# Patient Record
Sex: FEMALE | Race: BLACK OR AFRICAN AMERICAN | Employment: UNEMPLOYED | URBAN - METROPOLITAN AREA
[De-identification: names, ages, dates, MRNs, and addresses within clinical notes are randomized per-mention and may not be internally consistent; named-entity substitution may affect disease eponyms.]

---

## 2024-09-03 ENCOUNTER — HOSPITAL ENCOUNTER (EMERGENCY)
Facility: HOSPITAL | Age: 59
Discharge: HOME | End: 2024-09-03
Attending: EMERGENCY MEDICINE
Payer: COMMERCIAL

## 2024-09-03 VITALS
TEMPERATURE: 97.7 F | OXYGEN SATURATION: 98 % | RESPIRATION RATE: 18 BRPM | HEART RATE: 79 BPM | DIASTOLIC BLOOD PRESSURE: 87 MMHG | SYSTOLIC BLOOD PRESSURE: 157 MMHG

## 2024-09-03 DIAGNOSIS — G89.29 CHRONIC BILATERAL LOW BACK PAIN WITH LEFT-SIDED SCIATICA: Primary | ICD-10-CM

## 2024-09-03 DIAGNOSIS — M54.42 CHRONIC BILATERAL LOW BACK PAIN WITH LEFT-SIDED SCIATICA: Primary | ICD-10-CM

## 2024-09-03 LAB
ALBUMIN SERPL BCG-MCNC: 4.1 G/DL (ref 3.5–5)
ALP SERPL-CCNC: 68 U/L (ref 34–104)
ALT SERPL W P-5'-P-CCNC: 9 U/L (ref 7–52)
ANION GAP SERPL CALCULATED.3IONS-SCNC: 4 MMOL/L (ref 4–13)
AST SERPL W P-5'-P-CCNC: 14 U/L (ref 13–39)
BACTERIA UR QL AUTO: ABNORMAL /HPF
BASOPHILS # BLD AUTO: 0.04 THOUSANDS/ÂΜL (ref 0–0.1)
BASOPHILS NFR BLD AUTO: 1 % (ref 0–1)
BILIRUB SERPL-MCNC: 0.32 MG/DL (ref 0.2–1)
BILIRUB UR QL STRIP: NEGATIVE
BUN SERPL-MCNC: 13 MG/DL (ref 5–25)
CALCIUM SERPL-MCNC: 9.1 MG/DL (ref 8.4–10.2)
CHLORIDE SERPL-SCNC: 105 MMOL/L (ref 96–108)
CLARITY UR: CLEAR
CO2 SERPL-SCNC: 30 MMOL/L (ref 21–32)
COLOR UR: ABNORMAL
CREAT SERPL-MCNC: 0.87 MG/DL (ref 0.6–1.3)
EOSINOPHIL # BLD AUTO: 0.23 THOUSAND/ÂΜL (ref 0–0.61)
EOSINOPHIL NFR BLD AUTO: 5 % (ref 0–6)
ERYTHROCYTE [DISTWIDTH] IN BLOOD BY AUTOMATED COUNT: 13 % (ref 11.6–15.1)
GFR SERPL CREATININE-BSD FRML MDRD: 73 ML/MIN/1.73SQ M
GLUCOSE SERPL-MCNC: 139 MG/DL (ref 65–140)
GLUCOSE UR STRIP-MCNC: NEGATIVE MG/DL
HCT VFR BLD AUTO: 36.4 % (ref 34.8–46.1)
HGB BLD-MCNC: 12.1 G/DL (ref 11.5–15.4)
HGB UR QL STRIP.AUTO: ABNORMAL
IMM GRANULOCYTES # BLD AUTO: 0.03 THOUSAND/UL (ref 0–0.2)
IMM GRANULOCYTES NFR BLD AUTO: 1 % (ref 0–2)
KETONES UR STRIP-MCNC: NEGATIVE MG/DL
LEUKOCYTE ESTERASE UR QL STRIP: NEGATIVE
LYMPHOCYTES # BLD AUTO: 2.25 THOUSANDS/ÂΜL (ref 0.6–4.47)
LYMPHOCYTES NFR BLD AUTO: 43 % (ref 14–44)
MCH RBC QN AUTO: 30.3 PG (ref 26.8–34.3)
MCHC RBC AUTO-ENTMCNC: 33.2 G/DL (ref 31.4–37.4)
MCV RBC AUTO: 91 FL (ref 82–98)
MONOCYTES # BLD AUTO: 0.43 THOUSAND/ÂΜL (ref 0.17–1.22)
MONOCYTES NFR BLD AUTO: 9 % (ref 4–12)
NEUTROPHILS # BLD AUTO: 2.07 THOUSANDS/ÂΜL (ref 1.85–7.62)
NEUTS SEG NFR BLD AUTO: 41 % (ref 43–75)
NITRITE UR QL STRIP: NEGATIVE
NON-SQ EPI CELLS URNS QL MICRO: ABNORMAL /HPF
NRBC BLD AUTO-RTO: 0 /100 WBCS
PH UR STRIP.AUTO: 6.5 [PH]
PLATELET # BLD AUTO: 323 THOUSANDS/UL (ref 149–390)
PMV BLD AUTO: 8.9 FL (ref 8.9–12.7)
POTASSIUM SERPL-SCNC: 3.4 MMOL/L (ref 3.5–5.3)
PROT SERPL-MCNC: 7 G/DL (ref 6.4–8.4)
PROT UR STRIP-MCNC: NEGATIVE MG/DL
RBC # BLD AUTO: 4 MILLION/UL (ref 3.81–5.12)
RBC #/AREA URNS AUTO: ABNORMAL /HPF
SODIUM SERPL-SCNC: 139 MMOL/L (ref 135–147)
SP GR UR STRIP.AUTO: 1.02 (ref 1–1.03)
UROBILINOGEN UR STRIP-ACNC: <2 MG/DL
WBC # BLD AUTO: 5.05 THOUSAND/UL (ref 4.31–10.16)
WBC #/AREA URNS AUTO: ABNORMAL /HPF

## 2024-09-03 PROCEDURE — 96360 HYDRATION IV INFUSION INIT: CPT

## 2024-09-03 PROCEDURE — 99284 EMERGENCY DEPT VISIT MOD MDM: CPT | Performed by: EMERGENCY MEDICINE

## 2024-09-03 PROCEDURE — 36415 COLL VENOUS BLD VENIPUNCTURE: CPT

## 2024-09-03 PROCEDURE — 80053 COMPREHEN METABOLIC PANEL: CPT

## 2024-09-03 PROCEDURE — 85025 COMPLETE CBC W/AUTO DIFF WBC: CPT

## 2024-09-03 PROCEDURE — 99283 EMERGENCY DEPT VISIT LOW MDM: CPT

## 2024-09-03 PROCEDURE — 81001 URINALYSIS AUTO W/SCOPE: CPT

## 2024-09-03 RX ORDER — CYCLOBENZAPRINE HCL 5 MG
5 TABLET ORAL 3 TIMES DAILY PRN
Qty: 90 TABLET | Refills: 0 | Status: SHIPPED | OUTPATIENT
Start: 2024-09-03

## 2024-09-03 RX ORDER — ACETAMINOPHEN 325 MG/1
650 TABLET ORAL ONCE
Status: COMPLETED | OUTPATIENT
Start: 2024-09-03 | End: 2024-09-03

## 2024-09-03 RX ORDER — LIDOCAINE 50 MG/G
1 PATCH TOPICAL ONCE
Status: DISCONTINUED | OUTPATIENT
Start: 2024-09-03 | End: 2024-09-03 | Stop reason: HOSPADM

## 2024-09-03 RX ADMIN — LIDOCAINE 1 PATCH: 50 PATCH CUTANEOUS at 17:01

## 2024-09-03 RX ADMIN — SODIUM CHLORIDE 1000 ML: 0.9 INJECTION, SOLUTION INTRAVENOUS at 16:21

## 2024-09-03 RX ADMIN — ACETAMINOPHEN 650 MG: 325 TABLET ORAL at 17:00

## 2024-09-03 NOTE — ED ATTENDING ATTESTATION
9/3/2024  ILeny MD, saw and evaluated the patient. I have discussed the patient with the resident/non-physician practitioner and agree with the resident's/non-physician practitioner's findings, Plan of Care, and MDM as documented in the resident's/non-physician practitioner's note, except where noted. All available labs and Radiology studies were reviewed.  I was present for key portions of any procedure(s) performed by the resident/non-physician practitioner and I was immediately available to provide assistance.       At this point I agree with the current assessment done in the Emergency Department.  I have conducted an independent evaluation of this patient a history and physical is as follows:    59-year-old female with a history of CKD, prediabetes, chronic back pain presenting for evaluation of worsening back pain and left lower extremity pain.  Patient states she has had this pain for several months.  Denies trauma or injury.  Notes pain in the left side of her back with radiation down her left leg.  Was taking Flexeril and a month-long taper of prednisone.  Patient states she initially felt better but stopped these medications 1 week ago.  Since that time, patient notes pain is worse.  Notes a bandlike pain sensation across her lower back with radiation to her left leg that is worse in the morning.  Patient also notes tension and pain in her neck.  Denies paresthesias, focal weakness, saddle anesthesia, bowel or bladder retention or incontinence.  Patient denies fever, upper respiratory symptoms, chest pain, shortness of breath, nausea, vomiting, abdominal pain, dysuria, hematuria.  Patient has also been taking acetaminophen as needed.    Please see resident documentation for histories and review of systems.    Exam: Vital signs and nursing notes reviewed  General: Awake, alert, no acute distress  HEENT: Normocephalic, atraumatic, mucous membranes moist  Neck: No midline cervical  tenderness to palpation.  Left cervical paraspinal tenderness with spasm  Back: No midline thoracic or lumbar tenderness to palpation.  Left paraspinal tenderness with spasm, left piriformis tenderness, left SI joint tenderness  Heart: Regular rate and rhythm  Lungs: Clear to auscultation bilaterally  Abdomen: Soft, nontender, nondistended  Extremities: No swelling or deformity  Skin: Warm, dry, intact, no rash  Neuro: Strength is intact 5/5 with flexion and extension of the bilateral lower extremities at the hip, knee, ankle.  Reflexes 1+ bilaterally at the knee.  Sensation intact and equal bilateral lower extremities    ED Course  ED Course as of 24 0015   Tue Sep 2024   1638 CBC and differential(!)  Grossly normal   1648 Blood, UA(!): Small   1648 Ketones, UA: Negative   1648 Leukocytes, UA: Negative   1648 Nitrite, UA: Negative   1650 Potassium(!): 3.4   1650 Creatinine: 0.87   1655 RBC Urine(!): 20-30   1658 WBC, UA: 1-2   1658 Epithelial Cells: Occasional   1658 Bacteria, UA: Occasional     ED course/Medical Decision Makin-year-old female presenting for evaluation of back pain.  Differential diagnosis includes lumbar strain, herniated disc, sciatica, piriformis syndrome, sacroiliitis, urinary tract infection, pyelonephritis.  CBC and CMP were obtained and are grossly normal.  Urinalysis shows some blood and occasional bacteria but no white blood cells.  Patient denies urinary symptoms.  Low suspicion for urinary cause of her symptoms.  There is no midline spinal tenderness to palpation, and my suspicion for fracture is low.  Low suspicion for cauda equina syndrome, epidural abscess, epidural hematoma given intact lower extremity neurological examination and lack of red flag symptoms.  Patient treated supportively for her back pain with some improvement.  Suspect lumbar strain with sciatica.  At this time, patient is appropriate for discharge home with outpatient follow-up.  Counseled on  additional supportive measures, exercises, importance of physical therapy, and follow-up.  Return precautions discussed.  Patient is in agreement and understanding of these instructions.    Diagnosis: Lumbar strain with sciatica  Disposition: Discharge

## 2024-09-03 NOTE — ED PROVIDER NOTES
"History  Chief Complaint   Patient presents with    Pain     Pt started with neck pain 2 years ago. PCP prescribed pain meds for neck pain 1 month ago. Pt now having back pain also and it radiates to L leg. Pt recommended to see a specialist for this pain.      HPI  59-year-old female with past medical history of chronic kidney disease, prediabetes and chronic back pain who presents for back pain radiating to the left leg and 3 days of polyuria.  The patient states that she is in [place] due to a family emergency for the past 3 months and has run out of her muscle relaxant and prednisone which was prescribed by her PCP for her back pain.  The patient states that her back pain is bandlike in distribution in the thoracolumbar region and radiates through her entire left leg.  She also notes that her pain is worse in the morning and begins in her neck radiating to her left leg.  She notes that her pain has increased after she has run out of her Flexeril and prednisone 2 1/2 weeks ago. The patient denies incontinence, saddle anesthesia, numbness or paresthesias.    She patient notes that she was hospitalized 1 year ago due to acute kidney injury, but states that she has not had a kidney function test in 6 months.  She also states that she has been suffering from polyuria for the past 5 days, but denies dysuria, foul-smelling urine or hematuria.  She states that she does not drink \"any water during the day\", but drinks a 12 pack of Coca-Cola each day.  She attests to diarrhea for the past 2 days. The patient denies abdominal pain, nausea, constipation.    None       History reviewed. No pertinent past medical history.    History reviewed. No pertinent surgical history.    History reviewed. No pertinent family history.  I have reviewed and agree with the history as documented.    E-Cigarette/Vaping     E-Cigarette/Vaping Substances     Social History     Tobacco Use    Smoking status: Never    Smokeless tobacco: Never "        Review of Systems   Constitutional:  Negative for chills and fever.   HENT:  Negative for ear pain and sore throat.    Eyes:  Negative for pain and visual disturbance.   Respiratory:  Negative for cough and shortness of breath.    Cardiovascular:  Negative for chest pain and palpitations.   Gastrointestinal:  Positive for diarrhea. Negative for abdominal pain, blood in stool, constipation, nausea and vomiting.   Genitourinary:  Negative for dysuria and hematuria.   Musculoskeletal:  Positive for back pain, gait problem and neck pain. Negative for arthralgias.   Skin:  Negative for color change and rash.   Neurological:  Negative for seizures and syncope.   All other systems reviewed and are negative.      Physical Exam  ED Triage Vitals   Temperature Pulse Respirations Blood Pressure SpO2   09/03/24 1443 09/03/24 1443 09/03/24 1443 09/03/24 1443 09/03/24 1443   97.7 °F (36.5 °C) 87 18 138/94 99 %      Temp src Heart Rate Source Patient Position - Orthostatic VS BP Location FiO2 (%)   -- 09/03/24 1443 09/03/24 1443 09/03/24 1443 --    Monitor Sitting Right arm       Pain Score       09/03/24 1700       10 - Worst Possible Pain             Orthostatic Vital Signs  Vitals:    09/03/24 1443 09/03/24 1710 09/03/24 1721   BP: 138/94 (!) 146/102 157/87   Pulse: 87 81 79   Patient Position - Orthostatic VS: Sitting Sitting        Physical Exam  GENERAL APPEARANCE:  AxOx4, generally well-appearing female, no acute distress.  HEENT:  NC, AT. MMM. EOMI, clear conjunctiva, oropharynx clear.  NECK:  Supple without lymphadenopathy.  No stiffness or restricted ROM.  HEART:  Normal rate and regular rhythm, normal S1/S2, no m/r/g  LUNGS:  CTAB, moving air well. No crackles or wheezes are heard.  ABDOMEN:  Soft, nontender, nondistended with good bowel sounds heard.  BACK: No obvious deformity.  Tenderness localized in the thoracolumbar region.  EXTREMITIES:  Without cyanosis, clubbing or edema.  NEUROLOGICAL:  Grossly  nonfocal. Alert and oriented, moving all 4 extremities. CN not formally tested but appear grossly intact. Observed to ambulate with antalgic gait favoring the left leg.  Skin:  Warm and dry without any rash.    ED Medications  Medications   sodium chloride 0.9 % bolus 1,000 mL (0 mL Intravenous Stopped 9/3/24 1709)   acetaminophen (TYLENOL) tablet 650 mg (650 mg Oral Given 9/3/24 1700)       Diagnostic Studies  Results Reviewed       Procedure Component Value Units Date/Time    Urine Microscopic [[id number]]  (Abnormal) Collected: 09/03/24 1621    Lab Status: Final result Specimen: Urine, Clean Catch Updated: 09/03/24 1653     RBC, UA 20-30 /hpf      WBC, UA 1-2 /hpf      Epithelial Cells Occasional /hpf      Bacteria, UA Occasional /hpf     Comprehensive metabolic panel [[id number]]  (Abnormal) Collected: 09/03/24 1621    Lab Status: Final result Specimen: Blood from Arm, Left Updated: 09/03/24 1649     Sodium 139 mmol/L      Potassium 3.4 mmol/L      Chloride 105 mmol/L      CO2 30 mmol/L      ANION GAP 4 mmol/L      BUN 13 mg/dL      Creatinine 0.87 mg/dL      Glucose 139 mg/dL      Calcium 9.1 mg/dL      AST 14 U/L      ALT 9 U/L      Alkaline Phosphatase 68 U/L      Total Protein 7.0 g/dL      Albumin 4.1 g/dL      Total Bilirubin 0.32 mg/dL      eGFR 73 ml/min/1.73sq m     Narrative:      National Kid[employer] guidelines for Chronic Kidney Disease (CKD):     Stage 1 with normal or high GFR (GFR > 90 mL/min/1.73 square meters)    Stage 2 Mild CKD (GFR = 60-89 mL/min/1.73 square meters)    Stage 3A Moderate CKD (GFR = 45-59 mL/min/1.73 square meters)    Stage 3B Moderate CKD (GFR = 30-44 mL/min/1.73 square meters)    Stage 4 Severe CKD (GFR = 15-29 mL/min/1.73 square meters)    Stage 5 End Stage CKD (GFR <15 mL/min/1.73 square meters)  Note: GFR calculation is accurate only with a steady state creatinine    UA w Reflex to Microscopic w Reflex to Culture [[id number]]  (Abnormal) Collected: 09/03/24 1621  "   Lab Status: Final result Specimen: Urine, Clean Catch Updated: 09/03/24 1647     Color, UA Light Yellow     Clarity, UA Clear     Specific Gravity, UA 1.020     pH, UA 6.5     Leukocytes, UA Negative     Nitrite, UA Negative     Protein, UA Negative mg/dl      Glucose, UA Negative mg/dl      Ketones, UA Negative mg/dl      Urobilinogen, UA <2.0 mg/dl      Bilirubin, UA Negative     Occult Blood, UA Small    CBC and differential [[id number]]  (Abnormal) Collected: 09/03/24 1621    Lab Status: Final result Specimen: Blood from Arm, Left Updated: 09/03/24 1631     WBC 5.05 Thousand/uL      RBC 4.00 Million/uL      Hemoglobin 12.1 g/dL      Hematocrit 36.4 %      MCV 91 fL      MCH 30.3 pg      MCHC 33.2 g/dL      RDW 13.0 %      MPV 8.9 fL      Platelets 323 Thousands/uL      nRBC 0 /100 WBCs      Segmented % 41 %      Immature Grans % 1 %      Lymphocytes % 43 %      Monocytes % 9 %      Eosinophils Relative 5 %      Basophils Relative 1 %      Absolute Neutrophils 2.07 Thousands/µL      Absolute Immature Grans 0.03 Thousand/uL      Absolute Lymphocytes 2.25 Thousands/µL      Absolute Monocytes 0.43 Thousand/µL      Eosinophils Absolute 0.23 Thousand/µL      Basophils Absolute 0.04 Thousands/µL                    No orders to display         Procedures  Procedures      ED Course                                       Medical Decision Making  Amount and/or Complexity of Data Reviewed  Labs: ordered.    Risk  OTC drugs.  Prescription drug management.      \"   Chief Complaint   Patient presents with    Pain     Pt started with neck pain 2 years ago. PCP prescribed pain meds for neck pain 1 month ago. Pt now having back pain also and it radiates to L leg. Pt recommended to see a specialist for this pain.        Initial ED Assessment: 59-year-old female with past medical history of prediabetes and chronic back pain with sciatica presents for severe neck pain and radiating pain to the left leg as well as polyuria.     DDx " "including but not limited to: sciatica, herniated disc, arthritis, spinal stenosis, strain, sprain, fracture, transverse myelitis, urinary tract infection, diabetes mellitus, pyelonephritis or DKA.  Doubt cauda equina syndrome due to lack of red flag symptoms including saddle anesthesia or urinary/fecal incontinence.  Doubt spinal abscess due to lack of infectious symptoms such as fever or rash.    Initial ED Plan and results: CBC, CMP and UA    \"Patient's labs notable for: CBC is grossly normal, mild hypokalemia, normal creatinine, UA shows small blood and RBCs, no nitrates no leukocytes or protein.\"    At this time her lab results and urine test does not show signs of acute kidney injury, DKA or diabetes mellitus.    Final ED Summary/Disposition: Patient seen and examined. Noted to have lower back pain with left leg sciatica.  Suspect lumbar strain.  Refilled the patient's Flexeril medication with counseling to take at bedtime and avoid driving or operating heavy machinery while on this medication.  Gave the patient at home exercises for neck pain and back pain with counseling to pursue physical therapy in [place] when she goes home.  Strict return precautions were given including incontinence, saddle anesthesia or fevers.    \"Patient appears well, is nontoxic appearing, Maricarmen expresses understanding and agrees with plan of care at this time.  In light of this patient would benefit from outpatient management.\"      Disposition  Final diagnoses:   Chronic bilateral low back pain with left-sided sciatica     Time reflects when diagnosis was documented in both MDM as applicable and the Disposition within this note       Time User Action Codes Description Comment    9/3/2024  5:15 PM Kalia Bowman [M54.30] Sciatic leg pain     9/3/2024  5:16 PM Kalia Bowman [M54.42,  G89.29] Chronic bilateral low back pain with left-sided sciatica     9/3/2024  5:16 PM Kalia Bowman Modify [M54.42,  G89.29] Chronic bilateral low " back pain with left-sided sciatica     9/3/2024  5:16 PM Kalia Bowman [M54.30] Sciatic leg pain           ED Disposition       ED Disposition   Discharge    Condition   Stable    Date/Time   Tue Sep 3, 2024  5:18 PM    Comment   Maricarmen Lindsey discharge to home/self care.                   Follow-up Information       Follow up With Specialties Details Why Contact Info Additional Information    [facility] Emergency Department Emergency Medicine  As needed, If symptoms worsen [address] [facility] [id number]  [phone] [facility] Emergency Department, [address] [place], [place], [address]            Discharge Medication List as of 9/3/2024  5:18 PM        START taking these medications    Details   cyclobenzaprine (FLEXERIL) 5 mg tablet Take 1 tablet (5 mg total) by mouth 3 (three) times a day as needed for muscle spasms, Starting Tue 9/3/2024, Normal           No discharge procedures on file.    PDMP Review         Value Time User    PDMP Reviewed  Yes 9/3/2024  4:28 PM Leny Arceo MD             ED Provider  Attending physically available and evaluated Maricarmen Lindsey. I managed the patient along with the ED Attending.    Electronically Signed by           Kalia Bowman DO  09/04/24 [id number]

## 2024-09-03 NOTE — DISCHARGE INSTRUCTIONS
Please talk to your primary care provider about pursuing physical therapy when you return home to [place].    You can apply the lidocaine patch once every 12 hours.  Please use up to 1 a day.    Take the Flexeril as prescribed.  Be aware that the medication can be sedating. Consider taking this before bed.    Take Tylenol 1000 mg every 8 hours as needed for pain.    Please drink plenty of fluids per day. Roughly 90 oz of water per day.